# Patient Record
Sex: FEMALE | Race: WHITE
[De-identification: names, ages, dates, MRNs, and addresses within clinical notes are randomized per-mention and may not be internally consistent; named-entity substitution may affect disease eponyms.]

---

## 2019-09-06 ENCOUNTER — HOSPITAL ENCOUNTER (EMERGENCY)
Dept: HOSPITAL 62 - ER | Age: 44
Discharge: HOME | End: 2019-09-06
Payer: COMMERCIAL

## 2019-09-06 VITALS — DIASTOLIC BLOOD PRESSURE: 74 MMHG | SYSTOLIC BLOOD PRESSURE: 115 MMHG

## 2019-09-06 DIAGNOSIS — J02.9: Primary | ICD-10-CM

## 2019-09-06 DIAGNOSIS — L03.311: ICD-10-CM

## 2019-09-06 PROCEDURE — 99282 EMERGENCY DEPT VISIT SF MDM: CPT

## 2019-09-06 PROCEDURE — 87070 CULTURE OTHR SPECIMN AEROBIC: CPT

## 2019-09-06 PROCEDURE — 87880 STREP A ASSAY W/OPTIC: CPT

## 2019-09-06 NOTE — ER DOCUMENT REPORT
HPI





- HPI


Time Seen by Provider: 09/06/19 06:14


Pain Level: 4


Context: 





Patient is a 44-year-old female that comes to the emergency department for 2 

complaints.  Chief complaint is sore throat that started yesterday, she reports 

painful swallowing but she denies congestion, sinus pain, cough, fever.  She 

denies difficulty swallowing.  She denies any obvious exposures.  Second 

complaint is an area on her right lower abdomen where the skin became swollen, 

red, had a head, however patient states she already had this burst open and 

drained it is actually significantly improved.  Patient is not a diabetic.  She 

denies history of MRSA or abscesses.





- CONSTITUTIONAL


Constitutional: DENIES: Fever, Chills





- EENT


EENT: REPORTS: Sore Throat





- REPRODUCTIVE


Reproductive: DENIES: Pregnant:





Past Medical History





- General


Information source: Patient





- Social History


Smoking Status: Unknown if Ever Smoked


Frequency of alcohol use: None


Drug Abuse: None


Lives with: Family


Family History: Reviewed & Not Pertinent


Patient has suicidal ideation: No


Patient has homicidal ideation: No


Renal/ Medical History: Denies: Hx Peritoneal Dialysis


Musculoskeletal Medical History: Reports Hx Arthritis


Psychiatric Medical History: Reports: Hx Anxiety, Hx Depression


Surgical Hx: Negative





- Immunizations


Immunizations up to date: Yes


Hx Diphtheria, Pertussis, Tetanus Vaccination: Yes





Vertical Provider Document





- CONSTITUTIONAL


General Appearance: WD/WN, No Apparent Distress





- INFECTION CONTROL


TRAVEL OUTSIDE OF THE [place] IN LAST 30 DAYS: No





- HEENT


HEENT: Atraumatic, Normal ENT Exam - Unremarkable oropharyngeal exam with no 

noted erythema, swelling, or abnormality otherwise.  Normal ears, nasal exam, 

sinuses., Normocephalic





- NECK


Neck: Normal Inspection





- RESPIRATORY


Respiratory: Breath Sounds Normal, No Respiratory Distress





- CARDIOVASCULAR


Cardiovascular: Regular Rate, Regular Rhythm





- GI/ABDOMEN


Gastrointestinal: Abdomen Soft, Abdomen Non-Tender





- BACK


Back: Normal Inspection





- MUSCULOSKELETAL/EXTREMETIES


Musculoskeletal/Extremeties: MAEW, FROM, Non-Tender





- NEURO


Level of Consciousness: Awake, Alert, Appropriate





- DERM


Integumentary: Warm, Dry.  negative: Abscess - There is an erythematous warm 

area over the right lower abdomen, there is no induration or fluctuance.  There 

is a nearby area which appears to be a resolved abscess as well but there is no 

tenderness or concerning finding with this particular area.  Unremarkable 

otherwise.





Course





- Re-evaluation


Re-evalutation: 


Patient's oral pharyngeal exam is actually very unremarkable.  Strep is also 

negative.  No lymphadenopathy, swelling, erythema.  No fever.  There is a small 

area of cellulitis over the right lower abdomen, there is a nearby area of the 

resolved abscess, there is no induration or fluctuance suggesting current 

abscess.  Patient is very well-appearing.  Unremarkable vital signs.  Discussed 

with patient.  Placed on Keflex, discussed expectations, follow-up, return 

precautions, patient states understanding and agreement.  Stable at time of 

discharge.





- Vital Signs


Vital signs: 


                                        











Temp Pulse Resp BP Pulse Ox


 


 98.2 F   98   16   115/74   98 


 


 09/06/19 06:02  09/06/19 06:02  09/06/19 06:02  09/06/19 06:02  09/06/19 06:02














Discharge





- Discharge


Clinical Impression: 


 Sore throat





Cellulitis


Qualifiers:


 Site of cellulitis: other site Qualified Code(s): L03.818 - Cellulitis of other

sites





Condition: Stable


Disposition: HOME, SELF-CARE


Additional Instructions: 


The strep stress test negative.





Your evaluation is consistent with a resolved abscess, there is some cellulitis 

(skin infection) over the area of the abdomen, take the Keflex antibiotic as 

prescribed to completion.





Follow-up with primary care.  Return if you worsen including spreading redness, 

fever, or any other concerning or worsening symptoms. 


Prescriptions: 


Cephalexin Monohydrate [Keflex 500 mg Capsule] 500 mg PO QID #16 capsule


Cephalexin Monohydrate [Keflex 500 mg Capsule] 500 mg PO QID #12 capsule


Referrals: 


NICOLAS RUELAS MD [Primary Care Provider] - Follow up as needed

## 2019-09-07 ENCOUNTER — HOSPITAL ENCOUNTER (EMERGENCY)
Dept: HOSPITAL 62 - ER | Age: 44
Discharge: HOME | End: 2019-09-07
Payer: COMMERCIAL

## 2019-09-07 VITALS — SYSTOLIC BLOOD PRESSURE: 106 MMHG | DIASTOLIC BLOOD PRESSURE: 72 MMHG

## 2019-09-07 DIAGNOSIS — R53.81: ICD-10-CM

## 2019-09-07 DIAGNOSIS — L03.311: ICD-10-CM

## 2019-09-07 DIAGNOSIS — Z87.891: ICD-10-CM

## 2019-09-07 DIAGNOSIS — J02.9: ICD-10-CM

## 2019-09-07 DIAGNOSIS — B35.4: Primary | ICD-10-CM

## 2019-09-07 LAB
APPEARANCE UR: (no result)
APTT PPP: YELLOW S
BILIRUB UR QL STRIP: NEGATIVE
GLUCOSE UR STRIP-MCNC: NEGATIVE MG/DL
KETONES UR STRIP-MCNC: NEGATIVE MG/DL
NITRITE UR QL STRIP: NEGATIVE
PH UR STRIP: 9 [PH] (ref 5–9)
PROT UR STRIP-MCNC: NEGATIVE MG/DL
SP GR UR STRIP: 1.01
UROBILINOGEN UR-MCNC: NEGATIVE MG/DL (ref ?–2)

## 2019-09-07 PROCEDURE — 81001 URINALYSIS AUTO W/SCOPE: CPT

## 2019-09-07 PROCEDURE — 99283 EMERGENCY DEPT VISIT LOW MDM: CPT

## 2019-09-07 PROCEDURE — 82962 GLUCOSE BLOOD TEST: CPT

## 2019-09-07 PROCEDURE — 87210 SMEAR WET MOUNT SALINE/INK: CPT

## 2019-09-07 PROCEDURE — 87086 URINE CULTURE/COLONY COUNT: CPT

## 2019-09-07 NOTE — ER DOCUMENT REPORT
ED General





- General


Chief Complaint: Skin Problem


Stated Complaint: SORE THROAT


Time Seen by Provider: 09/07/19 13:00


Primary Care Provider: 


NICOLAS RUELAS MD [Primary Care Provider] - Follow up in 3-5 days


Mode of Arrival: Ambulatory


TRAVEL OUTSIDE OF THE [place] IN LAST 30 DAYS: No





- HPI


Notes: 





44-year-old female to the emergency department with complaints of persistent 

sore throat and now a new evolving rash is under her breast and under her right 

side of her pannus.  She states that over the past week she has been 

experiencing  fatigue and sore throat.  She states that she had a little area of

an ingrown hair with surrounding redness as well to her right lower abdomen.  

She states that she was seen yesterday morning and had a negative strep test.  

She was placed on Keflex for the cellulitis.  And she went home.  She states 

that today her sore throat has persisted despite taking several doses of Keflex.

 And now she has an itchy rash that sort of burns as well under her breasts and 

under her pannus.  She has not taken anything else for sore throat.  She denies 

any fevers.  Denies any chest pain, nausea vomiting diarrhea, difficulty swall

owing, drooling, facial swelling, or shortness of breath.





- Related Data


Allergies/Adverse Reactions: 


                                        





No Known Allergies Allergy (Verified 09/07/19 12:40)


   











Past Medical History





- General


Information source: Patient





- Social History


Smoking Status: Former Smoker


Frequency of alcohol use: None


Drug Abuse: None


Family History: Reviewed & Not Pertinent


Patient has suicidal ideation: No


Patient has homicidal ideation: No


Renal/ Medical History: Denies: Hx Peritoneal Dialysis


Musculoskeletal Medical History: Reports Hx Arthritis


Psychiatric Medical History: Reports: Hx Anxiety, Hx Depression





- Immunizations


Immunizations up to date: Yes


Hx Diphtheria, Pertussis, Tetanus Vaccination: Yes





Review of Systems





- Review of Systems


Constitutional: Malaise.  denies: Chills, Fever


EENT: See HPI, Throat pain.  denies: Throat swelling


Cardiovascular: denies: Chest pain, Palpitations, Dyspnea, Syncope, Dizziness, 

Lightheaded


Respiratory: denies: Cough, Short of breath


Gastrointestinal: denies: Abdominal pain, Diarrhea, Nausea, Vomiting


Genitourinary: No symptoms reported


Musculoskeletal: denies: Back pain, Joint swelling, Muscle pain, Muscle 

stiffness, Neck pain, Deformity


Skin: See HPI, Rash


Neurological/Psychological: No symptoms reported


-: Yes All other systems reviewed and negative





Physical Exam





- Vital signs


Vitals: 


                                        











Temp Pulse Resp BP Pulse Ox


 


 98.0 F   103 H  20   116/77   98 


 


 09/07/19 10:34  09/07/19 10:34  09/07/19 10:34  09/07/19 10:34  09/07/19 10:34











Interpretation: Normal





- General


General appearance: Appears well, Alert


In distress: None





- HEENT


Head: Normocephalic - A still having 22 charts or so often is a relative term, 

Atraumatic


Eyes: Normal


Pupils: PERRL


Ears: Normal


External canal: Normal


Tympanic membrane: Normal -  and supposed to drive to [facility] and another

thoughts, and plan


Sinus: Normal


Nasal: Normal


Mouth/Lips: Normal - It really does


Mucous membranes: Normal


Pharynx: Erythema.  No: Exudate, Peritonsillar abscess, Post nasal drainage, 

Retropharyngeal abscess, Tonsillar hypertrophy, Uvular edema - Mild erythema 

without exudates.  There is no tonsillar hypertrophy.  There is no drooling 

there is no trismus.  There is no Polo's angina.  airway is grossly patent, 

Potential airway comprom.


Neck: Normal, Supple.  No: Lymphadenopathy





- Respiratory


Respiratory status: No respiratory distress


Chest status: Nontender


Breath sounds: Normal


Chest palpation: Normal





- Cardiovascular


Rhythm: Regular


Heart sounds: Normal auscultation


Murmur: No





- Abdominal


Inspection: Normal


Distension: No distension


Bowel sounds: Normal


Tenderness: Nontender


Organomegaly: No organomegaly





- Skin


Skin Temperature: Warm


Skin Moisture: Dry


Skin irregularity: Rash - There is an erythematous rash to the skin underneath 

both breasts as well as the skin underneath the right side of her pannus.  It is

well demarcated and several areas with satellite lesions.  This is most 

consistent with yeast.  There is no superimposed infection.  Noted area of c

ellulitis to the right lower abdomen is improving and nontender to palpation.





Course





- Re-evaluation


Re-evalutation: 





09/07/19 


Urinalysis and Accu-Chek glucose.  Possible the Keflex is allowing an 

opportunity for yeast to grow on her body.  We will send home with nystatin 

powder and also have her take a Diflucan at the end of her antibiotics.  She is 

feeling better with her throat after the viscous lidocaine.  We will send home 

with viscous lidocaine.  Patient agrees with the plan urged to return if any 

worsening symptoms.  PCP follow-up.








- Vital Signs


Vital signs: 


                                        











Temp Pulse Resp BP Pulse Ox


 


 97.8 F   90   18   106/72   100 


 


 09/07/19 15:54  09/07/19 15:54  09/07/19 12:52  09/07/19 15:54  09/07/19 15:54














- Laboratory


Laboratory results interpreted by me: 


                                        











  09/07/19





  15:35


 


POC Glucose  114 H














Discharge





- Discharge


Clinical Impression: 


 Tinea corporis





Pharyngitis


Qualifiers:


 Pharyngitis/tonsillitis etiology: unspecified etiology Qualified Code(s): J02.9

- Acute pharyngitis, unspecified





Condition: Stable


Disposition: HOME, SELF-CARE


Instructions:  Sore Throat ([facility])


Additional Instructions: 


TAKE ALL MEDICINES AS PRESCRIBED.   PUSH FLUIDS.  REST.  FOLLOW UP WITH PRIMARY 

CARE.   USE TOPICAL ANTI YEAST MEDICINE WHILE TAKING KEFLEX AND THEN TAKE A 

DIFLUCAN AT THE END OF THE ANTIBIOTICS. 


Prescriptions: 


Fluconazole [Diflucan] 150 mg PO ONCE PRN #1 tablet


 PRN Reason: 


Nystatin [Mycostatin Topical Powder 15 gm] 1 applic TP BID #1 bottle


Lidocaine HCl [Xylocaine 2% Viscous Soln 20 ml Udcup] 15 ml PO TID #1 bottle


Referrals: 


NICOLAS RUELAS MD [Primary Care Provider] - Follow up in 3-5 days